# Patient Record
Sex: FEMALE | Race: BLACK OR AFRICAN AMERICAN | Employment: PART TIME | URBAN - METROPOLITAN AREA
[De-identification: names, ages, dates, MRNs, and addresses within clinical notes are randomized per-mention and may not be internally consistent; named-entity substitution may affect disease eponyms.]

---

## 2018-09-25 ENCOUNTER — HOSPITAL ENCOUNTER (EMERGENCY)
Age: 28
Discharge: HOME | End: 2018-09-25
Attending: EMERGENCY MEDICINE
Payer: SELF-PAY

## 2018-09-25 VITALS
HEART RATE: 114 BPM | BODY MASS INDEX: 31.28 KG/M2 | RESPIRATION RATE: 20 BRPM | WEIGHT: 170 LBS | HEIGHT: 62 IN | OXYGEN SATURATION: 100 % | DIASTOLIC BLOOD PRESSURE: 83 MMHG | SYSTOLIC BLOOD PRESSURE: 125 MMHG | TEMPERATURE: 98 F

## 2018-09-25 DIAGNOSIS — J02.9 ACUTE PHARYNGITIS, UNSPECIFIED ETIOLOGY: ICD-10-CM

## 2018-09-25 DIAGNOSIS — N76.0 BV (BACTERIAL VAGINOSIS): Primary | ICD-10-CM

## 2018-09-25 DIAGNOSIS — R73.9 HYPERGLYCEMIA WITHOUT KETOSIS: ICD-10-CM

## 2018-09-25 DIAGNOSIS — N94.9 GENITAL LESION, FEMALE: ICD-10-CM

## 2018-09-25 DIAGNOSIS — B96.89 BV (BACTERIAL VAGINOSIS): Primary | ICD-10-CM

## 2018-09-25 LAB
ANION GAP SERPL CALC-SCNC: 8 MMOL/L (ref 3–18)
APPEARANCE UR: CLEAR
BILIRUB UR QL: NEGATIVE
BUN SERPL-MCNC: 6 MG/DL (ref 7–18)
BUN/CREAT SERPL: 7 (ref 12–20)
CALCIUM SERPL-MCNC: 9.1 MG/DL (ref 8.5–10.1)
CHLORIDE SERPL-SCNC: 98 MMOL/L (ref 100–108)
CO2 SERPL-SCNC: 27 MMOL/L (ref 21–32)
COLOR UR: YELLOW
CREAT SERPL-MCNC: 0.9 MG/DL (ref 0.6–1.3)
GLUCOSE BLD STRIP.AUTO-MCNC: 388 MG/DL (ref 70–110)
GLUCOSE SERPL-MCNC: 341 MG/DL (ref 74–99)
GLUCOSE UR STRIP.AUTO-MCNC: >1000 MG/DL
HCG UR QL: NEGATIVE
HGB UR QL STRIP: NEGATIVE
KETONES UR QL STRIP.AUTO: NEGATIVE MG/DL
LEUKOCYTE ESTERASE UR QL STRIP.AUTO: NEGATIVE
NITRITE UR QL STRIP.AUTO: NEGATIVE
PH UR STRIP: 6 [PH] (ref 5–8)
POTASSIUM SERPL-SCNC: 3.5 MMOL/L (ref 3.5–5.5)
PROT UR STRIP-MCNC: NEGATIVE MG/DL
SERVICE CMNT-IMP: NORMAL
SODIUM SERPL-SCNC: 133 MMOL/L (ref 136–145)
SP GR UR REFRACTOMETRY: >1.03 (ref 1–1.03)
UROBILINOGEN UR QL STRIP.AUTO: 1 EU/DL (ref 0.2–1)
WET PREP GENITAL: NORMAL

## 2018-09-25 PROCEDURE — 81003 URINALYSIS AUTO W/O SCOPE: CPT | Performed by: PHYSICIAN ASSISTANT

## 2018-09-25 PROCEDURE — 87081 CULTURE SCREEN ONLY: CPT | Performed by: EMERGENCY MEDICINE

## 2018-09-25 PROCEDURE — 81025 URINE PREGNANCY TEST: CPT

## 2018-09-25 PROCEDURE — 99284 EMERGENCY DEPT VISIT MOD MDM: CPT

## 2018-09-25 PROCEDURE — 87210 SMEAR WET MOUNT SALINE/INK: CPT | Performed by: PHYSICIAN ASSISTANT

## 2018-09-25 PROCEDURE — 87077 CULTURE AEROBIC IDENTIFY: CPT | Performed by: EMERGENCY MEDICINE

## 2018-09-25 PROCEDURE — 80048 BASIC METABOLIC PNL TOTAL CA: CPT | Performed by: PHYSICIAN ASSISTANT

## 2018-09-25 PROCEDURE — 87255 GENET VIRUS ISOLATE HSV: CPT | Performed by: PHYSICIAN ASSISTANT

## 2018-09-25 PROCEDURE — 82962 GLUCOSE BLOOD TEST: CPT

## 2018-09-25 PROCEDURE — 87491 CHLMYD TRACH DNA AMP PROBE: CPT | Performed by: PHYSICIAN ASSISTANT

## 2018-09-25 RX ORDER — ACYCLOVIR 400 MG/1
400 TABLET ORAL 3 TIMES DAILY
Qty: 30 TAB | Refills: 0 | Status: SHIPPED | OUTPATIENT
Start: 2018-09-25 | End: 2018-10-05

## 2018-09-25 RX ORDER — METRONIDAZOLE 500 MG/1
500 TABLET ORAL 2 TIMES DAILY
Qty: 14 TAB | Refills: 0 | Status: SHIPPED | OUTPATIENT
Start: 2018-09-25 | End: 2018-10-02

## 2018-09-25 NOTE — LETTER
[facility] [place] 
[facility] 
[address] [address] 
[phone] Date: 09/29/2018 Vernelle Cabot Please call [facility] Emergency Department at [phone] to discuss your results as soon as possible. Sincerely, Triston Brooke PA-C

## 2018-09-25 NOTE — ED PROVIDER NOTES
EMERGENCY DEPARTMENT HISTORY AND PHYSICAL EXAM    Date: 9/25/2018  Patient Name: Moises Blackman    History of Presenting Illness     Chief Complaint   Patient presents with    Missed Menses    Vaginal Pain       History Provided By: Patient    Chief Complaint: vaginal sores  Duration: 2-3 Days  Timing:  Acute  Location: vagina  Associated Symptoms: back pain    Additional History (Context):   5:23 PM  Moises Blackman is a 32 y.o. female who presents to the emergency department C/O non tender, vaginal sores onset 2-3 days ago. Associated symptoms include back pain. Pt is sexually active and has a new sexual partner. Pt is concerned for pregnancy. Last menstrual period 1 month ago. Reports recent cold symptoms including sore throat, congestion, hot flashes, and chills. Pmhx of diabetes; pt is noncompliant with medication. Pt denies vaginal discharge, vaginal bleeding, cough, generalized myalgia, abdominal pain, and any other Sx or complaints. PCP: None      Past History     Past Medical History:  History reviewed. No pertinent past medical history. Past Surgical History:  History reviewed. No pertinent surgical history. Family History:  History reviewed. No pertinent family history. Social History:  Social History   Substance Use Topics    Smoking status: Never Smoker    Smokeless tobacco: Never Used    Alcohol use Yes      Comment: socially       Allergies:  No Known Allergies      Review of Systems   Review of Systems   Constitutional: Positive for chills. (+) hot flashes   HENT: Positive for congestion and sore throat. Respiratory: Negative for cough. Cardiovascular: Negative for chest pain. Gastrointestinal: Negative for nausea and vomiting. Genitourinary: Positive for genital sores. Negative for dysuria, flank pain, vaginal bleeding and vaginal discharge. (-) vaginal sores   Musculoskeletal: Positive for back pain. Negative for arthralgias, joint swelling and myalgias. Neurological: Negative for light-headedness. Hematological: Negative for adenopathy. All other systems reviewed and are negative. Physical Exam     Vitals:    09/25/18 1719   BP: 125/83   Pulse: (!) 114   Resp: 20   Temp: 98 °F (36.7 °C)   SpO2: 100%   Weight: 77.1 kg (170 lb)   Height: 5' 2\" (1.575 m)     Physical Exam   Constitutional: She is oriented to person, place, and time. She appears well-developed and well-nourished. No distress. AA female in NAD. Alert. In fast track room   HENT:   Head: Normocephalic and atraumatic. Right Ear: External ear normal. No swelling or tenderness. Tympanic membrane is not perforated, not erythematous and not bulging. Left Ear: External ear normal. No swelling or tenderness. Tympanic membrane is not perforated, not erythematous and not bulging. Nose: Mucosal edema present. No rhinorrhea. Right sinus exhibits no maxillary sinus tenderness and no frontal sinus tenderness. Left sinus exhibits no maxillary sinus tenderness and no frontal sinus tenderness. Mouth/Throat: Uvula is midline and mucous membranes are normal. No oral lesions. No trismus in the jaw. No dental abscesses or uvula swelling. Posterior oropharyngeal erythema present. No oropharyngeal exudate, posterior oropharyngeal edema or tonsillar abscesses. Eyes: Conjunctivae are normal. Right eye exhibits no discharge. Left eye exhibits no discharge. No scleral icterus. Neck: Normal range of motion. Cardiovascular: Normal rate, regular rhythm, normal heart sounds and intact distal pulses. Exam reveals no gallop and no friction rub. No murmur heard. Pulmonary/Chest: Effort normal and breath sounds normal. No accessory muscle usage. No tachypnea. No respiratory distress. She has no decreased breath sounds. She has no wheezes. She has no rhonchi. She has no rales. Abdominal: Soft. She exhibits no distension. There is no tenderness.    Genitourinary:   Genitourinary Comments: Female chaperone in room. See pelvic procedure note. Verbal consent obtained prior to procedure. Musculoskeletal: Normal range of motion. Neurological: She is alert and oriented to person, place, and time. Skin: Skin is warm and dry. No rash noted. She is not diaphoretic. No erythema. Psychiatric: She has a normal mood and affect. Judgment normal.   Nursing note and vitals reviewed. Diagnostic Study Results     Labs -     No results found for this or any previous visit (from the past 12 hour(s)). Radiologic Studies -   No orders to display     CT Results  (Last 48 hours)    None        CXR Results  (Last 48 hours)    None          Medications given in the ED-  Medications - No data to display      Medical Decision Making   I am the first provider for this patient. I reviewed the vital signs, available nursing notes, past medical history, past surgical history, family history and social history. Vital Signs-Reviewed the patient's vital signs. Pulse Oximetry Analysis - 100% on RA     Records Reviewed: Nursing Notes and Old Medical Records    Provider Notes (Medical Decision Making): HSV, BV, UTI, trich, candidiasis, URI, strep, sinusitis, bronchitis, PNA    Procedures:  Pelvic Exam  Date/Time: 9/25/2018 6:00 PM  Performed by: PA  Procedure duration:  15 minutes. As dictated by: Ángel Mcgowan PA-C  Exam assisted by:  Female chaperone in room. Type of exam performed: bimanual and speculum. External genitalia appearance: ulcers. Vaginal exam:  discharge. The amount of discharge was:  scant. The discharge was thin. Cervical exam:  normal and no cervical motion tenderness. Specimen(s) collected:  chlamydia, GC and vaginal culture (HSV culture). Bimanual exam:  normal.    Patient tolerance: Patient tolerated the procedure well with no immediate complications            ED Course:   5:23 PM Initial assessment performed.  The patients presenting problems have been discussed, and they are in agreement with the care plan formulated and outlined with them. I have encouraged them to ask questions as they arise throughout their visit.    7:00 PM Alerted by RN that patient was anxious to leave. Blood sugar has returned at 341 but not DKA. States she will take insulin at home and refusing any further care. Will treat for BV, likely HSV, and await cultures.  consult for PCP FU. Reasons to RTED discussed with pt. All questions answered. Pt feels comfortable going home at this time. Pt expressed understanding and she agrees with plan. Diagnosis and Disposition       DISCHARGE NOTE:  7:04 PM  Jeremie Clemens's  results have been reviewed with her. She has been counseled regarding her diagnosis, treatment, and plan. She verbally conveys understanding and agreement of the signs, symptoms, diagnosis, treatment and prognosis and additionally agrees to follow up as discussed. She also agrees with the care-plan and conveys that all of her questions have been answered. I have also provided discharge instructions for her that include: educational information regarding their diagnosis and treatment, and list of reasons why they would want to return to the ED prior to their follow-up appointment, should her condition change. She has been provided with education for proper emergency department utilization. CLINICAL IMPRESSION:    1. BV (bacterial vaginosis)    2. Genital lesion, female    3. Hyperglycemia without ketosis    4. Acute pharyngitis, unspecified etiology        PLAN:  1. D/C Home  2. Discharge Medication List as of 9/25/2018  7:00 PM      START taking these medications    Details   metroNIDAZOLE (FLAGYL) 500 mg tablet Take 1 Tab by mouth two (2) times a day for 7 days. Indications: Bacterial Vaginosis, Print, Disp-14 Tab, R-0      acyclovir (ZOVIRAX) 400 mg tablet Take 1 Tab by mouth three (3) times daily for 10 days.  Indications: genital herpes simplex, Print, Disp-30 Tab, R-0 3.   Follow-up Information     Follow up With Details Comments [address]   [address] [place] [address]   Follow up for diabetes [address]  [place] [address]    [facility]  As needed, If symptoms worsen [address] [place] [id number]  [phone]        _______________________________    Attestations: This note is prepared by Elvi Ro, acting as Scribe for [employer]. [employer], DUNCAN:  The scribe's documentation has been prepared under my direction and personally reviewed by me in its entirety.   I confirm that the note above accurately reflects all work, treatment, procedures, and medical decision making performed by me.  _______________________________

## 2018-09-25 NOTE — LETTER
Vernelle Cabot [address] [id number] 
 
 
09/27/18 To whom it may concern, Please be advised that Vernelle Cabot We have been trying to reach you with results of your visit but have been unsuccessful. Please call the Emergency Department for your results. Sincerely, Amelia Reich NP

## 2018-09-25 NOTE — ED NOTES
POCT HCG performed with negative results. Patient stable with no complaints at this time. Patient is resting in bed with family at bedside for support. Call light within reach and patient instructed to call if assistance is needed.   Report to be given to oncoming RN 7p-7a

## 2018-09-26 LAB
C TRACH RRNA SPEC QL NAA+PROBE: NEGATIVE
N GONORRHOEA RRNA SPEC QL NAA+PROBE: NEGATIVE
SPECIMEN SOURCE: NORMAL

## 2018-09-27 LAB
B-HEM STREP THROAT QL CULT: ABNORMAL
B-HEM STREP THROAT QL CULT: NEGATIVE
BACTERIA SPEC CULT: ABNORMAL
BACTERIA SPEC CULT: ABNORMAL
SERVICE CMNT-IMP: ABNORMAL

## 2018-09-27 NOTE — CALL BACK NOTE
Attempted to call patient with results of throat culture. There was no answer and I was unable to leave message as mailbox was full. Will send certified letter. Review of records shows patient was not treated Special Requests:   NO SPECIAL REQUESTS NO SPECIAL REQUESTS     
 Strep Screen   NEGATIVE      
 Strep Screen  (NOTE) STREP SCREEN DONE IN ED B. ..     
(NOTE) STREP SCREEN DONE IN ED BY [id number]  
    
 Culture result:   FEW STREPTOCOCCI, BETA HEMOLYT. .. (A)     
FEW STREPTOCOCCI, BETA HEMOLYTIC GROUP C    
 Culture result:   NO BETA HEMOLYTIC STREPTOCOCCUS G. ..     
NO BETA HEMOLYTIC STREPTOCOCCUS GROUP A ISOLATED    
Resulting Agency  [address] [place] [address] [facility] [place] Linnette Pinto 10:53 AM 9/27/18

## 2018-09-29 LAB
HSV SPEC CULT: ABNORMAL
SPECIMEN SOURCE: ABNORMAL

## 2018-09-29 NOTE — CALL BACK NOTE
7:42 PM 
09/29/2018 
 
+ HSV cultures. On antivirals. Called patient to inform of results. No answer. VM was full. Will send certified letter. Given to [facility].   
 
José Miguel Sherman PA-C

## 2018-10-31 ENCOUNTER — HOSPITAL ENCOUNTER (EMERGENCY)
Age: 28
Discharge: HOME | End: 2018-10-31
Attending: EMERGENCY MEDICINE
Payer: SELF-PAY

## 2018-10-31 VITALS
BODY MASS INDEX: 31.28 KG/M2 | OXYGEN SATURATION: 100 % | HEIGHT: 62 IN | SYSTOLIC BLOOD PRESSURE: 136 MMHG | TEMPERATURE: 98 F | RESPIRATION RATE: 18 BRPM | HEART RATE: 121 BPM | DIASTOLIC BLOOD PRESSURE: 84 MMHG | WEIGHT: 170 LBS

## 2018-10-31 DIAGNOSIS — Z86.39 HISTORY OF UNCONTROLLED DIABETES: ICD-10-CM

## 2018-10-31 DIAGNOSIS — L02.415 ABSCESS OF RIGHT THIGH: Primary | ICD-10-CM

## 2018-10-31 PROCEDURE — 77030019895 HC PCKNG STRP IODO -A

## 2018-10-31 PROCEDURE — 99282 EMERGENCY DEPT VISIT SF MDM: CPT

## 2018-10-31 PROCEDURE — 75810000289 HC I&D ABSCESS SIMP/COMP/MULT

## 2018-10-31 RX ORDER — IPRATROPIUM BROMIDE AND ALBUTEROL SULFATE 2.5; .5 MG/3ML; MG/3ML
3 SOLUTION RESPIRATORY (INHALATION)
Status: DISCONTINUED | OUTPATIENT
Start: 2018-10-31 | End: 2018-10-31 | Stop reason: CLARIF

## 2018-10-31 RX ORDER — SULFAMETHOXAZOLE AND TRIMETHOPRIM 800; 160 MG/1; MG/1
2 TABLET ORAL 2 TIMES DAILY
Qty: 40 TAB | Refills: 0 | Status: SHIPPED | OUTPATIENT
Start: 2018-10-31 | End: 2018-11-10

## 2018-10-31 NOTE — DISCHARGE INSTRUCTIONS
Skin Abscess: Care Instructions  Your Care Instructions    A skin abscess is a bacterial infection that forms a pocket of pus. A boil is a kind of skin abscess. The doctor may have cut an opening in the abscess so that the pus can drain out. You may have gauze in the cut so that the abscess will stay open and keep draining. You may need antibiotics. You will need to follow up with your doctor to make sure the infection has gone away. The doctor has checked you carefully, but problems can develop later. If you notice any problems or new symptoms, get medical treatment right away. Follow-up care is a key part of your treatment and safety. Be sure to make and go to all appointments, and call your doctor if you are having problems. It's also a good idea to know your test results and keep a list of the medicines you take. How can you care for yourself at home? · Apply warm and dry compresses, a heating pad set on low, or a hot water bottle 3 or 4 times a day for pain. Keep a cloth between the heat source and your skin. · If your doctor prescribed antibiotics, take them as directed. Do not stop taking them just because you feel better. You need to take the full course of antibiotics. · Take pain medicines exactly as directed. ? If the doctor gave you a prescription medicine for pain, take it as prescribed. ? If you are not taking a prescription pain medicine, ask your doctor if you can take an over-the-counter medicine. · Keep your bandage clean and dry. Change the bandage whenever it gets wet or dirty, or at least one time a day. · If the abscess was packed with gauze:  ? Keep follow-up appointments to have the gauze changed or removed. If the doctor instructed you to remove the gauze, follow the instructions you were given for how to remove it. ? After the gauze is removed, soak the area in warm water for 15 to 20 minutes 2 times a day, until the wound closes. When should you call for help?   Call your doctor now or seek immediate medical care if:    · You have signs of worsening infection, such as:  ? Increased pain, swelling, warmth, or redness. ? Red streaks leading from the infected skin. ? Pus draining from the wound. ? A fever.    Watch closely for changes in your health, and be sure to contact your doctor if:    · You do not get better as expected. Where can you learn more? Go to http://cortez-sotero.info/. Enter Z083 in the search box to learn more about \"Skin Abscess: Care Instructions. \"  Current as of: April 18, 2018  Content Version: 11.8  © 8256-2169 [employer]. Care instructions adapted under license by [employer] (which disclaims liability or warranty for this information). If you have questions about a medical condition or this instruction, always ask your healthcare professional. [address] any warranty or liability for your use of this information.

## 2018-10-31 NOTE — ED PROVIDER NOTES
EMERGENCY DEPARTMENT HISTORY AND PHYSICAL EXAM    Date: 10/31/2018  Patient Name: Jarek Moy    History of Presenting Illness     Chief Complaint   Patient presents with    Skin Problem         History Provided By: Patient    Chief Complaint: painful bump to the right medial proximal thigh  Duration: 1.5 weeks  Timing:  Worsening  Location: right medial proximal thigh  Modifying Factors: warm compresses without relief  Associated Symptoms: denies any other associated signs or symptoms    Additional History (Context):   1:46 PM   Jarek Moy is a 29 y.o. female with PMHX of uncontrolled DM and recurrent abscesses requiring I&D who presents to the emergency department C/O a painful bump the the right medial, proximal thigh which is gradually increasing in size starting 1.5 weeks ago. There has not been any drainage from the area. She has applied warm compresses to the area without relief. Pt is unsure what her blood sugar has been. LMP: 9/28/2018. Pt denies fever, chills, n/v, and any other sxs or complaints. PCP: None        Past History     Past Medical History:  Past Medical History:   Diagnosis Date    Diabetes [facility])        Past Surgical History:  History reviewed. No pertinent surgical history. Family History:  History reviewed. No pertinent family history. Social History:  Social History     Tobacco Use    Smoking status: Current Every Day Smoker    Smokeless tobacco: Never Used   Substance Use Topics    Alcohol use: Yes     Comment: socially    Drug use: No       Allergies:  No Known Allergies      Review of Systems   Review of Systems   Constitutional: Negative for chills and fever. Skin: Negative for rash. Painful bump to right medial proximal thigh   Allergic/Immunologic: Negative for immunocompromised state. Hematological: Negative for adenopathy. All other systems reviewed and are negative.       Physical Exam     Vitals:    10/31/18 1335   BP: 136/84   Pulse: (!) 121 Resp: 18   Temp: 98 °F (36.7 °C)   SpO2: 100%   Weight: 77.1 kg (170 lb)   Height: 5' 2\" (1.575 m)     Physical Exam   Constitutional: She is oriented to person, place, and time. She appears well-developed and well-nourished. No distress. AA female in NAD. Alert. Appears comfortable. HENT:   Head: Normocephalic and atraumatic. Right Ear: External ear normal.   Left Ear: External ear normal.   Nose: Nose normal.   Eyes: Conjunctivae are normal.   Neck: Normal range of motion. Cardiovascular: Normal rate, regular rhythm, normal heart sounds and intact distal pulses. Exam reveals no gallop and no friction rub. No murmur heard. Pulmonary/Chest: Effort normal and breath sounds normal. No accessory muscle usage. No tachypnea. No respiratory distress. She has no decreased breath sounds. She has no wheezes. She has no rhonchi. She has no rales. Musculoskeletal: Normal range of motion. Legs:  Neurological: She is alert and oriented to person, place, and time. Skin: Skin is warm and dry. She is not diaphoretic. Psychiatric: She has a normal mood and affect. Judgment normal.   Nursing note and vitals reviewed. Diagnostic Study Results     Labs -   No results found for this or any previous visit (from the past 12 hour(s)). Radiologic Studies -   No orders to display     Medications given in the ED-  Medications - No data to display      Medical Decision Making   I am the first provider for this patient. I reviewed the vital signs, available nursing notes, past medical history, past surgical history, family history and social history. Vital Signs-Reviewed the patient's vital signs. Pulse Oximetry Analysis - 100% on room air     Records Reviewed: Nursing Notes    Provider Notes (Medical Decision Making): abscess to R thigh. No evidence of deep or systemic process. Will I&D and place on ABX.      Procedures:  I&D Abcess Complex  Date/Time: 10/31/2018 1:52 PM  Performed by: Emir Alberto DUNCAN MONTES  Authorized by: April Richmond PA-C     Consent:     Consent obtained:  Verbal    Consent given by:  Patient    Risks discussed:  Pain    Alternatives discussed:  No treatment  Location:     Type:  Abscess    Size:  4 cm x 3 cm    Location:  Lower extremity    Lower extremity location:  Leg    Leg location:  R upper leg  Pre-procedure details:     Skin preparation:  Betadine  Anesthesia (see MAR for exact dosages): Anesthesia method:  Local infiltration    Local anesthetic:  Lidocaine 1% w/o epi  Procedure type:     Complexity:  Complex  Procedure details:     Needle aspiration: no      Incision types:  Single straight    Incision depth:  Dermal    Scalpel blade:  11    Wound management:  Probed and deloculated    Drainage:  Purulent    Drainage amount:  Copious    Wound treatment:  Wound left open    Packing materials:  1/4 in iodoform gauze    Amount 1/4\" iodoform:  5 cm   Post-procedure details:     Patient tolerance of procedure: Tolerated well, no immediate complications          ED Course:   1:46 PM Initial assessment performed. The patients presenting problems have been discussed, and they are in agreement with the care plan formulated and outlined with them. I have encouraged them to ask questions as they arise throughout their visit. Diagnosis and Disposition     Successful I&D. Packed. Removal in 48 hours. ABX. SITZ baths TID x 10 minutes until healed. Reasons to RTED discussed with pt. All questions answered. Pt feels comfortable going home at this time. Pt expressed understanding and she agrees with plan.  FU for PCP as patient is DMII with no medications. Discussed risks of uncontrolled BS. DISCHARGE NOTE:  2:43 PM   Albert Clemens's  results have been reviewed with her. She has been counseled regarding her diagnosis, treatment, and plan.   She verbally conveys understanding and agreement of the signs, symptoms, diagnosis, treatment and prognosis and additionally agrees to follow up as discussed. She also agrees with the care-plan and conveys that all of her questions have been answered. I have also provided discharge instructions for her that include: educational information regarding their diagnosis and treatment, and list of reasons why they would want to return to the ED prior to their follow-up appointment, should her condition change. She has been provided with education for proper emergency department utilization. CLINICAL IMPRESSION:    1. Abscess of right thigh    2. History of uncontrolled diabetes        PLAN:  1. D/C Home  2. Discharge Medication List as of 10/31/2018  2:42 PM      START taking these medications    Details   trimethoprim-sulfamethoxazole (BACTRIM DS) 160-800 mg per tablet Take 2 Tabs by mouth two (2) times a day for 10 days. , Normal, Disp-40 Tab, R-0           3. Follow-up Information     Follow up With Specialties Details Why [facility] Schedule an appointment as soon as possible for a visit in 2 days For primary care follow up [facility] [address]    THE [facility] EMERGENCY DEPT Emergency Medicine  As needed, If symptoms worsen [address] [address]  [phone]        _______________________________    Attestations: This note is prepared by Evelyn Nicolas, acting as Scribe for Bev Jane PA-C. Bev Jane PA-C:  The scribe's documentation has been prepared under my direction and personally reviewed by me in its entirety.   I confirm that the note above accurately reflects all work, treatment, procedures, and medical decision making performed by me.  _______________________________

## 2021-05-26 ENCOUNTER — APPOINTMENT (OUTPATIENT)
Dept: GENERAL RADIOLOGY | Age: 31
End: 2021-05-26
Attending: EMERGENCY MEDICINE

## 2021-05-26 ENCOUNTER — HOSPITAL ENCOUNTER (EMERGENCY)
Age: 31
Discharge: HOME | End: 2021-05-26
Attending: EMERGENCY MEDICINE

## 2021-05-26 VITALS
BODY MASS INDEX: 31.56 KG/M2 | SYSTOLIC BLOOD PRESSURE: 121 MMHG | OXYGEN SATURATION: 96 % | RESPIRATION RATE: 17 BRPM | DIASTOLIC BLOOD PRESSURE: 93 MMHG | HEIGHT: 62 IN | TEMPERATURE: 99.2 F | WEIGHT: 171.52 LBS | HEART RATE: 114 BPM

## 2021-05-26 DIAGNOSIS — R73.9 HYPERGLYCEMIA: ICD-10-CM

## 2021-05-26 DIAGNOSIS — R10.84 ABDOMINAL PAIN, GENERALIZED: Primary | ICD-10-CM

## 2021-05-26 LAB
ALBUMIN SERPL-MCNC: 3.6 G/DL (ref 3.5–5)
ALBUMIN/GLOB SERPL: 0.7 {RATIO} (ref 1.1–2.2)
ALP SERPL-CCNC: 139 U/L (ref 45–117)
ALT SERPL-CCNC: 18 U/L (ref 12–78)
ANION GAP SERPL CALC-SCNC: 13 MMOL/L (ref 5–15)
APPEARANCE UR: CLEAR
AST SERPL-CCNC: 10 U/L (ref 15–37)
BACTERIA URNS QL MICRO: NEGATIVE /HPF
BASOPHILS # BLD: 0 K/UL (ref 0–0.1)
BASOPHILS NFR BLD: 0 % (ref 0–1)
BILIRUB SERPL-MCNC: 0.3 MG/DL (ref 0.2–1)
BILIRUB UR QL: NEGATIVE
BUN SERPL-MCNC: 8 MG/DL (ref 6–20)
BUN/CREAT SERPL: 9 (ref 12–20)
CALCIUM SERPL-MCNC: 9.1 MG/DL (ref 8.5–10.1)
CHLORIDE SERPL-SCNC: 99 MMOL/L (ref 97–108)
CO2 SERPL-SCNC: 25 MMOL/L (ref 21–32)
COLOR UR: ABNORMAL
COMMENT, HOLDF: NORMAL
CREAT SERPL-MCNC: 0.9 MG/DL (ref 0.55–1.02)
DIFFERENTIAL METHOD BLD: ABNORMAL
EOSINOPHIL # BLD: 0 K/UL (ref 0–0.4)
EOSINOPHIL NFR BLD: 0 % (ref 0–7)
EPITH CASTS URNS QL MICRO: ABNORMAL /LPF
ERYTHROCYTE [DISTWIDTH] IN BLOOD BY AUTOMATED COUNT: 12.4 % (ref 11.5–14.5)
GLOBULIN SER CALC-MCNC: 5.1 G/DL (ref 2–4)
GLUCOSE SERPL-MCNC: 345 MG/DL (ref 65–100)
GLUCOSE UR STRIP.AUTO-MCNC: >1000 MG/DL
HCG SERPL-ACNC: 1 MIU/ML (ref 0–6)
HCG UR QL: NEGATIVE
HCT VFR BLD AUTO: 41.1 % (ref 35–47)
HGB BLD-MCNC: 13.6 G/DL (ref 11.5–16)
HGB UR QL STRIP: NEGATIVE
IMM GRANULOCYTES # BLD AUTO: 0 K/UL (ref 0–0.04)
IMM GRANULOCYTES NFR BLD AUTO: 1 % (ref 0–0.5)
KETONES UR QL STRIP.AUTO: 15 MG/DL
LEUKOCYTE ESTERASE UR QL STRIP.AUTO: NEGATIVE
LIPASE SERPL-CCNC: 124 U/L (ref 73–393)
LYMPHOCYTES # BLD: 2.5 K/UL (ref 0.8–3.5)
LYMPHOCYTES NFR BLD: 36 % (ref 12–49)
MCH RBC QN AUTO: 30.2 PG (ref 26–34)
MCHC RBC AUTO-ENTMCNC: 33.1 G/DL (ref 30–36.5)
MCV RBC AUTO: 91.1 FL (ref 80–99)
MONOCYTES # BLD: 0.5 K/UL (ref 0–1)
MONOCYTES NFR BLD: 7 % (ref 5–13)
MUCOUS THREADS URNS QL MICRO: ABNORMAL /LPF
NEUTS SEG # BLD: 3.9 K/UL (ref 1.8–8)
NEUTS SEG NFR BLD: 56 % (ref 32–75)
NITRITE UR QL STRIP.AUTO: NEGATIVE
NRBC # BLD: 0 K/UL (ref 0–0.01)
NRBC BLD-RTO: 0 PER 100 WBC
PH UR STRIP: 5.5 [PH] (ref 5–8)
PLATELET # BLD AUTO: 270 K/UL (ref 150–400)
PMV BLD AUTO: 9.3 FL (ref 8.9–12.9)
POTASSIUM SERPL-SCNC: 3.3 MMOL/L (ref 3.5–5.1)
PROT SERPL-MCNC: 8.7 G/DL (ref 6.4–8.2)
PROT UR STRIP-MCNC: ABNORMAL MG/DL
RBC # BLD AUTO: 4.51 M/UL (ref 3.8–5.2)
RBC #/AREA URNS HPF: ABNORMAL /HPF (ref 0–5)
SAMPLES BEING HELD,HOLD: NORMAL
SODIUM SERPL-SCNC: 137 MMOL/L (ref 136–145)
SP GR UR REFRACTOMETRY: 1.02 (ref 1–1.03)
UR CULT HOLD, URHOLD: NORMAL
UROBILINOGEN UR QL STRIP.AUTO: 0.2 EU/DL (ref 0.2–1)
WBC # BLD AUTO: 7 K/UL (ref 3.6–11)
WBC URNS QL MICRO: ABNORMAL /HPF (ref 0–4)

## 2021-05-26 PROCEDURE — 81025 URINE PREGNANCY TEST: CPT

## 2021-05-26 PROCEDURE — 83690 ASSAY OF LIPASE: CPT

## 2021-05-26 PROCEDURE — 80053 COMPREHEN METABOLIC PANEL: CPT

## 2021-05-26 PROCEDURE — 81001 URINALYSIS AUTO W/SCOPE: CPT

## 2021-05-26 PROCEDURE — 84702 CHORIONIC GONADOTROPIN TEST: CPT

## 2021-05-26 PROCEDURE — 85025 COMPLETE CBC W/AUTO DIFF WBC: CPT

## 2021-05-26 PROCEDURE — 99285 EMERGENCY DEPT VISIT HI MDM: CPT

## 2021-05-26 PROCEDURE — 74018 RADEX ABDOMEN 1 VIEW: CPT

## 2021-05-26 NOTE — ED PROVIDER NOTES
The history is provided by the patient and a friend. Abdominal Pain   This is a chronic problem. The current episode started more than 1 week ago. The problem occurs every several days. The problem has not changed since onset. The pain is located in the generalized abdominal region. The pain is mild. Pertinent negatives include no anorexia, no fever, no belching, no diarrhea, no flatus, no hematochezia, no melena, no nausea, no vomiting, no constipation, no dysuria, no frequency, no hematuria, no headaches, no arthralgias, no myalgias, no trauma, no chest pain, no testicular pain and no back pain. Nothing worsens the pain. The pain is relieved by nothing. The patient's surgical history non-contributory. Past Medical History:   Diagnosis Date    Diabetes ([facility] [address]        No past surgical history on file. History reviewed. No pertinent family history. Social History     Socioeconomic History    Marital status: SINGLE     Spouse name: Not on file    Number of children: Not on file    Years of education: Not on file    Highest education level: Not on file   Occupational History    Not on file   Tobacco Use    Smoking status: Current Every Day Smoker    Smokeless tobacco: Never Used   Substance and Sexual Activity    Alcohol use: Yes     Comment: socially    Drug use: No    Sexual activity: Not on file   Other Topics Concern    Not on file   Social History Narrative    Not on file     Social Determinants of Health     Financial Resource Strain:     Difficulty of Paying Living Expenses:    Food Insecurity:     Worried About Running Out of Food in the Last Year:     [address] in the Last Year:    Transportation Needs:     Lack of Transportation (Medical):      Lack of Transportation (Non-Medical):    Physical Activity:     Days of Exercise per Week:     Minutes of Exercise per Session:    Stress:     Feeling of Stress :    Social Connections:     Frequency of Communication with Friends and Family:     Frequency of Social Gatherings with Friends and Family:     Attends Caoda[employer] Services:     Active Member of Clubs or Organizations:     Attends Club or Organization Meetings:     Marital Status:    Intimate Partner Violence:     Fear of Current or Ex-Partner:     Emotionally Abused:     Physically Abused:     Sexually Abused: ALLERGIES: Patient has no known allergies. Review of Systems   Constitutional: Negative for activity change, chills and fever. HENT: Negative for nosebleeds, sore throat, trouble swallowing and voice change. Eyes: Negative for visual disturbance. Respiratory: Negative for shortness of breath. Cardiovascular: Negative for chest pain and palpitations. Gastrointestinal: Positive for abdominal pain. Negative for anorexia, constipation, diarrhea, flatus, hematochezia, melena, nausea and vomiting. Genitourinary: Negative for difficulty urinating, dysuria, frequency, hematuria, testicular pain and urgency. Musculoskeletal: Negative for arthralgias, back pain, myalgias, neck pain and neck stiffness. Skin: Negative for color change. Allergic/Immunologic: Negative for immunocompromised state. Neurological: Negative for dizziness, seizures, syncope, weakness, light-headedness, numbness and headaches. Psychiatric/Behavioral: Negative for behavioral problems, confusion, hallucinations, self-injury and suicidal ideas. Vitals:    05/26/21 1143   BP: (!) 132/95   Pulse: (!) 114   Resp: 17   Temp: 99.2 °F (37.3 °C)   SpO2: 100%   Weight: 77.8 kg (171 lb 8.3 oz)   Height: 5' 2\" (1.575 m)            Physical Exam  Vitals and nursing note reviewed. Constitutional:       General: She is not in acute distress. Appearance: She is well-developed. She is not diaphoretic. HENT:      Head: Normocephalic and atraumatic. Eyes:      Pupils: Pupils are equal, round, and reactive to light.    Cardiovascular:      Rate and Rhythm: Normal rate and regular rhythm. Heart sounds: Normal heart sounds. No murmur heard. No friction rub. No gallop. Pulmonary:      Effort: Pulmonary effort is normal. No respiratory distress. Breath sounds: Normal breath sounds. No wheezing. Abdominal:      General: Bowel sounds are normal. There is no distension. Palpations: Abdomen is soft. Tenderness: There is no abdominal tenderness. There is no guarding or rebound. Musculoskeletal:         General: Normal range of motion. Cervical back: Normal range of motion and neck supple. Skin:     General: Skin is warm. Findings: No rash. Neurological:      Mental Status: She is alert and oriented to person, place, and time. Psychiatric:         Behavior: Behavior normal.         Thought Content: Thought content normal.         Judgment: Judgment normal.          MDM     This is a 27-year-old female with past medical history, review of systems, physical exam as above, presenting with complaints of decreased menses, and abdominal distention, occasional cramping. Patient states last menses was 4 months ago. She endorses occasional cramping every several days. She denies nausea, vomiting, chest pain or shortness of breath, diarrhea, or constipation, dysuria, vaginal bleeding, vaginal pain, or discharge. She states she has not seen her GYN, nor her primary care physician. Physical exam is remarkable for a well-appearing young female, in no acute distress. She is noted to be mildly hypertensive, afebrile, tachycardic in triage, satting well on room air. She has soft nontender abdomen, clear breath sounds, regular rate and rhythm at the time of exam.  She currently denies pain. Point-of-care pregnancy test at bedside is negative. Plan to obtain CMP, CBC, UA, blood hCG, obtain KUB. We will reassess, and make a disposition.     Procedures    Update:  Patient remains in no acute distress, tachycardia improved with rest, hyperglycemia noted with glucosuria, without other metabolic abnormality. KUB within normal limits, negative pregnancy and serum hCG. Discussed differential with patient and friend at bedside. Recommend PCP and GYN follow-up, return precautions given.

## 2021-05-26 NOTE — ED TRIAGE NOTES
TRIAGE NOTE: Patient arrived from home with c/o abdominal pain for the past month. +N/V/D. Denies any urinary problems.

## 2022-06-21 ENCOUNTER — HOSPITAL ENCOUNTER (EMERGENCY)
Age: 32
Discharge: HOME | End: 2022-06-21
Attending: EMERGENCY MEDICINE
Payer: COMMERCIAL

## 2022-06-21 VITALS
TEMPERATURE: 98.5 F | OXYGEN SATURATION: 100 % | RESPIRATION RATE: 23 BRPM | HEART RATE: 82 BPM | BODY MASS INDEX: 32.18 KG/M2 | WEIGHT: 175.93 LBS | SYSTOLIC BLOOD PRESSURE: 134 MMHG | DIASTOLIC BLOOD PRESSURE: 90 MMHG

## 2022-06-21 DIAGNOSIS — R11.2 NAUSEA AND VOMITING, UNSPECIFIED VOMITING TYPE: Primary | ICD-10-CM

## 2022-06-21 LAB
ALBUMIN SERPL-MCNC: 3.5 G/DL (ref 3.5–5)
ALBUMIN/GLOB SERPL: 0.8 {RATIO} (ref 1.1–2.2)
ALP SERPL-CCNC: 97 U/L (ref 45–117)
ALT SERPL-CCNC: 14 U/L (ref 12–78)
ANION GAP SERPL CALC-SCNC: 9 MMOL/L (ref 5–15)
APPEARANCE UR: ABNORMAL
AST SERPL-CCNC: 8 U/L (ref 15–37)
BACTERIA URNS QL MICRO: ABNORMAL /HPF
BASOPHILS # BLD: 0 K/UL (ref 0–0.1)
BASOPHILS NFR BLD: 0 % (ref 0–1)
BILIRUB SERPL-MCNC: 0.2 MG/DL (ref 0.2–1)
BILIRUB UR QL: NEGATIVE
BUN SERPL-MCNC: 11 MG/DL (ref 6–20)
BUN/CREAT SERPL: 13 (ref 12–20)
CALCIUM SERPL-MCNC: 8.8 MG/DL (ref 8.5–10.1)
CHLORIDE SERPL-SCNC: 102 MMOL/L (ref 97–108)
CO2 SERPL-SCNC: 27 MMOL/L (ref 21–32)
COLOR UR: ABNORMAL
COMMENT, HOLDF: NORMAL
CREAT SERPL-MCNC: 0.88 MG/DL (ref 0.55–1.02)
DIFFERENTIAL METHOD BLD: NORMAL
EOSINOPHIL # BLD: 0 K/UL (ref 0–0.4)
EOSINOPHIL NFR BLD: 0 % (ref 0–7)
EPITH CASTS URNS QL MICRO: ABNORMAL /LPF
ERYTHROCYTE [DISTWIDTH] IN BLOOD BY AUTOMATED COUNT: 12.4 % (ref 11.5–14.5)
GLOBULIN SER CALC-MCNC: 4.6 G/DL (ref 2–4)
GLUCOSE SERPL-MCNC: 275 MG/DL (ref 65–100)
GLUCOSE UR STRIP.AUTO-MCNC: >1000 MG/DL
HCG SERPL QL: NEGATIVE
HCT VFR BLD AUTO: 39.3 % (ref 35–47)
HGB BLD-MCNC: 12.9 G/DL (ref 11.5–16)
HGB UR QL STRIP: ABNORMAL
IMM GRANULOCYTES # BLD AUTO: 0 K/UL (ref 0–0.04)
IMM GRANULOCYTES NFR BLD AUTO: 0 % (ref 0–0.5)
KETONES UR QL STRIP.AUTO: ABNORMAL MG/DL
LEUKOCYTE ESTERASE UR QL STRIP.AUTO: NEGATIVE
LYMPHOCYTES # BLD: 2.2 K/UL (ref 0.8–3.5)
LYMPHOCYTES NFR BLD: 37 % (ref 12–49)
MCH RBC QN AUTO: 30.6 PG (ref 26–34)
MCHC RBC AUTO-ENTMCNC: 32.8 G/DL (ref 30–36.5)
MCV RBC AUTO: 93.1 FL (ref 80–99)
MONOCYTES # BLD: 0.3 K/UL (ref 0–1)
MONOCYTES NFR BLD: 6 % (ref 5–13)
NEUTS SEG # BLD: 3.4 K/UL (ref 1.8–8)
NEUTS SEG NFR BLD: 57 % (ref 32–75)
NITRITE UR QL STRIP.AUTO: NEGATIVE
NRBC # BLD: 0 K/UL (ref 0–0.01)
NRBC BLD-RTO: 0 PER 100 WBC
PH UR STRIP: 6 [PH] (ref 5–8)
PLATELET # BLD AUTO: 290 K/UL (ref 150–400)
PMV BLD AUTO: 8.9 FL (ref 8.9–12.9)
POTASSIUM SERPL-SCNC: 3.3 MMOL/L (ref 3.5–5.1)
PROT SERPL-MCNC: 8.1 G/DL (ref 6.4–8.2)
PROT UR STRIP-MCNC: NEGATIVE MG/DL
RBC # BLD AUTO: 4.22 M/UL (ref 3.8–5.2)
RBC #/AREA URNS HPF: ABNORMAL /HPF (ref 0–5)
SAMPLES BEING HELD,HOLD: NORMAL
SODIUM SERPL-SCNC: 138 MMOL/L (ref 136–145)
SP GR UR REFRACTOMETRY: 1.02 (ref 1–1.03)
UR CULT HOLD, URHOLD: NORMAL
UROBILINOGEN UR QL STRIP.AUTO: 1 EU/DL (ref 0.2–1)
WBC # BLD AUTO: 6 K/UL (ref 3.6–11)
WBC URNS QL MICRO: ABNORMAL /HPF (ref 0–4)

## 2022-06-21 PROCEDURE — 81001 URINALYSIS AUTO W/SCOPE: CPT

## 2022-06-21 PROCEDURE — 84703 CHORIONIC GONADOTROPIN ASSAY: CPT

## 2022-06-21 PROCEDURE — 74011250636 HC RX REV CODE- 250/636: Performed by: EMERGENCY MEDICINE

## 2022-06-21 PROCEDURE — 96374 THER/PROPH/DIAG INJ IV PUSH: CPT

## 2022-06-21 PROCEDURE — 85025 COMPLETE CBC W/AUTO DIFF WBC: CPT

## 2022-06-21 PROCEDURE — 99284 EMERGENCY DEPT VISIT MOD MDM: CPT

## 2022-06-21 PROCEDURE — 80053 COMPREHEN METABOLIC PANEL: CPT

## 2022-06-21 PROCEDURE — 36415 COLL VENOUS BLD VENIPUNCTURE: CPT

## 2022-06-21 RX ORDER — ONDANSETRON 2 MG/ML
4 INJECTION INTRAMUSCULAR; INTRAVENOUS
Status: COMPLETED | OUTPATIENT
Start: 2022-06-21 | End: 2022-06-21

## 2022-06-21 RX ORDER — ONDANSETRON 8 MG/1
8 TABLET, ORALLY DISINTEGRATING ORAL
Qty: 15 TABLET | Refills: 0 | Status: SHIPPED | OUTPATIENT
Start: 2022-06-21 | End: 2022-06-22 | Stop reason: SDUPTHER

## 2022-06-21 RX ADMIN — ONDANSETRON HYDROCHLORIDE 4 MG: 2 SOLUTION INTRAMUSCULAR; INTRAVENOUS at 19:56

## 2022-06-21 RX ADMIN — SODIUM CHLORIDE 1000 ML: 9 INJECTION, SOLUTION INTRAVENOUS at 19:56

## 2022-06-21 NOTE — ED TRIAGE NOTES
Pt c/o n/v and b/l leg weakness x5 days. She also reports she missed her menstrual cycle was missed 6/9. PT states she has been unable to take her metformin and notes her sugars are elevated. Pt presents to Ed awake, alert, oriented, ambulatory, uncomfortable.

## 2022-06-22 RX ORDER — ONDANSETRON 8 MG/1
8 TABLET, ORALLY DISINTEGRATING ORAL
Qty: 15 TABLET | Refills: 0 | Status: SHIPPED | OUTPATIENT
Start: 2022-06-22 | End: 2022-08-17

## 2022-06-22 NOTE — ED PROVIDER NOTES
The history is provided by the patient. Vomiting   This is a new problem. The current episode started more than 2 days ago. The problem occurs 2 to 4 times per day. The problem has not changed since onset. The emesis has an appearance of stomach contents. There has been no fever. Pertinent negatives include no chills, no fever, no abdominal pain and no diarrhea. The patient maybe pregnant. Her past medical history is significant for DM. Past Medical History:   Diagnosis Date    Diabetes ([address]        No past surgical history on file. No family history on file. Social History     Socioeconomic History    Marital status: SINGLE     Spouse name: Not on file    Number of children: Not on file    Years of education: Not on file    Highest education level: Not on file   Occupational History    Not on file   Tobacco Use    Smoking status: Current Every Day Smoker    Smokeless tobacco: Never Used   Substance and Sexual Activity    Alcohol use: Yes     Comment: socially    Drug use: No    Sexual activity: Not on file   Other Topics Concern    Not on file   Social History Narrative    Not on file     Social Determinants of Health     Financial Resource Strain:     Difficulty of Paying Living Expenses: Not on file   Food Insecurity:     Worried About Running Out of Food in the Last Year: Not on file    Jos of Food in the Last Year: Not on file   Transportation Needs:     Lack of Transportation (Medical): Not on file    Lack of Transportation (Non-Medical):  Not on file   Physical Activity:     Days of Exercise per Week: Not on file    Minutes of Exercise per Session: Not on file   Stress:     Feeling of Stress : Not on file   Social Connections:     Frequency of Communication with Friends and Family: Not on file    Frequency of Social Gatherings with Friends and Family: Not on file    Attends Restoration Services: Not on file    Active Member of Clubs or Organizations: Not on file   [facility] Attends Club or Organization Meetings: Not on file    Marital Status: Not on file   Intimate Partner Violence:     Fear of Current or Ex-Partner: Not on file    Emotionally Abused: Not on file    Physically Abused: Not on file    Sexually Abused: Not on file   Housing Stability:     Unable to Pay for Housing in the Last Year: Not on file    Number of [place] in the Last Year: Not on file    Unstable Housing in the Last Year: Not on file         ALLERGIES: Patient has no known allergies. Review of Systems   Constitutional: Negative for chills and fever. Gastrointestinal: Positive for nausea and vomiting. Negative for abdominal pain and diarrhea. Genitourinary: Negative for dysuria, flank pain, frequency and urgency. All other systems reviewed and are negative. Vitals:    06/21/22 1944   BP: (!) 135/96   Pulse: (!) 101   Resp: 16   Temp: 98.5 °F (36.9 °C)   SpO2: 99%   Weight: 79.8 kg (175 lb 14.8 oz)            Physical Exam  Vitals and nursing note reviewed. Constitutional:       Appearance: She is well-developed. HENT:      Head: Normocephalic and atraumatic. Eyes:      Pupils: Pupils are equal, round, and reactive to light. Cardiovascular:      Rate and Rhythm: Regular rhythm. Tachycardia present. Pulmonary:      Effort: Pulmonary effort is normal.   Abdominal:      General: There is no distension. Palpations: Abdomen is soft. Tenderness: There is no abdominal tenderness. Musculoskeletal:      Cervical back: Normal range of motion and neck supple. Skin:     General: Skin is warm and dry. Capillary Refill: Capillary refill takes less than 2 seconds. Neurological:      General: No focal deficit present. Mental Status: She is alert and oriented to person, place, and time.    Psychiatric:         Mood and Affect: Mood normal.         Behavior: Behavior normal.          MDM       Procedures        The patient is resting comfortably and feels better, is alert and in no distress. The repeat examination is unremarkable and benign; in particular, there is no discomfort at McBurney's point. The history, exam, diagnostic testing, and current condition do not suggest acute appendicitis, bowel obstruction, incarcerated hernia, acute cholecystitis, bowel perforation, major gastrointestinal bleeding, severe diverticulitis, sepsis, DKA, or other significant pathology to warrant further testing, continued ED treatment, admission, or surgical evaluation at this point. The vital signs have been stable and are within normal limits at this time. The patient does not have uncontrollable pain, intractable vomiting, or other significant symptoms. The patient's condition is stable and appropriate for discharge. The patient will pursue further outpatient evaluation with the primary care physician or other designated or consulting physician as indicated in the discharge instructions.

## 2022-06-22 NOTE — ED NOTES
PT reports feeling mildly better, c/o mild headache. PT provided with diet ginger ale and yandel nino for PO challenge.

## 2022-08-17 ENCOUNTER — APPOINTMENT (OUTPATIENT)
Dept: GENERAL RADIOLOGY | Age: 32
End: 2022-08-17
Attending: EMERGENCY MEDICINE
Payer: COMMERCIAL

## 2022-08-17 ENCOUNTER — HOSPITAL ENCOUNTER (EMERGENCY)
Age: 32
Discharge: HOME | End: 2022-08-17
Attending: EMERGENCY MEDICINE
Payer: COMMERCIAL

## 2022-08-17 VITALS
HEIGHT: 62 IN | WEIGHT: 162.04 LBS | DIASTOLIC BLOOD PRESSURE: 83 MMHG | BODY MASS INDEX: 29.82 KG/M2 | SYSTOLIC BLOOD PRESSURE: 109 MMHG | RESPIRATION RATE: 18 BRPM | TEMPERATURE: 98.1 F | HEART RATE: 97 BPM | OXYGEN SATURATION: 98 %

## 2022-08-17 DIAGNOSIS — J45.901 ASTHMA WITH ACUTE EXACERBATION, UNSPECIFIED ASTHMA SEVERITY, UNSPECIFIED WHETHER PERSISTENT: Primary | ICD-10-CM

## 2022-08-17 LAB
ATRIAL RATE: 106 BPM
CALCULATED P AXIS, ECG09: 61 DEGREES
CALCULATED R AXIS, ECG10: 12 DEGREES
CALCULATED T AXIS, ECG11: 9 DEGREES
DIAGNOSIS, 93000: NORMAL
P-R INTERVAL, ECG05: 132 MS
Q-T INTERVAL, ECG07: 360 MS
QRS DURATION, ECG06: 70 MS
QTC CALCULATION (BEZET), ECG08: 478 MS
VENTRICULAR RATE, ECG03: 106 BPM

## 2022-08-17 PROCEDURE — 77030029684 HC NEB SM VOL KT MONA -A

## 2022-08-17 PROCEDURE — 74011636637 HC RX REV CODE- 636/637: Performed by: EMERGENCY MEDICINE

## 2022-08-17 PROCEDURE — 74011000250 HC RX REV CODE- 250: Performed by: EMERGENCY MEDICINE

## 2022-08-17 PROCEDURE — 93005 ELECTROCARDIOGRAM TRACING: CPT

## 2022-08-17 PROCEDURE — 71046 X-RAY EXAM CHEST 2 VIEWS: CPT

## 2022-08-17 PROCEDURE — 99284 EMERGENCY DEPT VISIT MOD MDM: CPT

## 2022-08-17 RX ORDER — ALBUTEROL SULFATE 90 UG/1
2 AEROSOL, METERED RESPIRATORY (INHALATION)
Qty: 18 G | Refills: 0 | Status: SHIPPED | OUTPATIENT
Start: 2022-08-17

## 2022-08-17 RX ORDER — IPRATROPIUM BROMIDE AND ALBUTEROL SULFATE 2.5; .5 MG/3ML; MG/3ML
3 SOLUTION RESPIRATORY (INHALATION)
Status: COMPLETED | OUTPATIENT
Start: 2022-08-17 | End: 2022-08-17

## 2022-08-17 RX ORDER — PREDNISONE 50 MG/1
50 TABLET ORAL DAILY
Qty: 3 TABLET | Refills: 0 | Status: SHIPPED | OUTPATIENT
Start: 2022-08-18 | End: 2022-08-21

## 2022-08-17 RX ADMIN — IPRATROPIUM BROMIDE AND ALBUTEROL SULFATE 3 ML: .5; 3 SOLUTION RESPIRATORY (INHALATION) at 16:19

## 2022-08-17 RX ADMIN — PREDNISONE 50 MG: 5 TABLET ORAL at 16:19

## 2022-08-17 NOTE — ED TRIAGE NOTES
Patient arrives ambulatory to ed with complaints of shortness of breath for the past two weeks. Patient states a history of asthma, does not take any medications. Denies cough or fevers.

## 2022-08-17 NOTE — DISCHARGE INSTRUCTIONS
Thank you for allowing us to provide you with medical care today. We realize that you have many choices for your emergency care needs. We thank you for choosing [facility]. Please choose us in the future for any continued health care needs. We hope we addressed all of your medical concerns. We strive to provide excellent quality care in the Emergency Department. Anything less than excellent does not meet our expectations. The exam and treatment you received in the Emergency Department were for an emergent problem and are not intended as complete care. It is important that you follow up with a doctor, nurse practitioner, or physician's assistant for ongoing care. If your symptoms worsen or you do not improve as expected and you are unable to reach your usual health care provider, you should return to the Emergency Department. We are available 24 hours a day. Take this sheet with you when you go to your follow-up visit. If you have any problem arranging the follow-up visit, contact the Emergency Department immediately. Make an appointment your family doctor for follow up of this visit. Return to the ER if you are unable to be seen in a timely manner.

## 2022-08-17 NOTE — ED PROVIDER NOTES
35-year-old female history of asthma and diabetes presents to the emergency department chief complaint shortness of breath for the past 2 weeks. This is been intermittently worsening for the past 2 weeks. She currently does not use any controller medications for asthma. She has not noticed any exacerbating or alleviating factors. No cough. No fever. No nausea or vomiting. No headaches. No sick contacts. The history is provided by the patient and medical records. Asthma  This is a new problem. The current episode started more than 1 week ago. The problem occurs constantly. Associated symptoms include shortness of breath. Pertinent negatives include no chest pain, no abdominal pain and no headaches. She has tried nothing for the symptoms. Past Medical History:   Diagnosis Date    Asthma     Diabetes ([facility] [address]        History reviewed. No pertinent surgical history. History reviewed. No pertinent family history. Social History     Socioeconomic History    Marital status: SINGLE     Spouse name: Not on file    Number of children: Not on file    Years of education: Not on file    Highest education level: Not on file   Occupational History    Not on file   Tobacco Use    Smoking status: Every Day     Types: Cigarettes    Smokeless tobacco: Current   Substance and Sexual Activity    Alcohol use: Yes     Comment: socially    Drug use: No    Sexual activity: Not on file   Other Topics Concern    Not on file   Social History Narrative    Not on file     Social Determinants of Health     Financial Resource Strain: Not on file   Food Insecurity: Not on file   Transportation Needs: Not on file   Physical Activity: Not on file   Stress: Not on file   Social Connections: Not on file   Intimate Partner Violence: Not on file   Housing Stability: Not on file         ALLERGIES: Patient has no known allergies. Review of Systems   Constitutional:  Negative for fatigue and fever.    HENT:  Negative for sneezing and sore throat. Respiratory:  Positive for shortness of breath. Negative for cough. Cardiovascular:  Negative for chest pain and leg swelling. Gastrointestinal:  Negative for abdominal pain, diarrhea, nausea and vomiting. Genitourinary:  Negative for difficulty urinating and dysuria. Musculoskeletal:  Negative for arthralgias and myalgias. Skin:  Negative for color change and rash. Neurological:  Negative for weakness and headaches. Psychiatric/Behavioral:  Negative for agitation and behavioral problems. Vitals:    08/17/22 1539 08/17/22 1540 08/17/22 1544   BP: 113/81     Pulse: (!) 110     Resp: 18     Temp: 98.1 °F (36.7 °C)     SpO2:  98% 98%   Weight: 73.5 kg (162 lb 0.6 oz)     Height: 5' 2\" (1.575 m)              Physical Exam  Vitals and nursing note reviewed. Constitutional:       General: She is not in acute distress. Appearance: Normal appearance. She is well-developed. She is not ill-appearing, toxic-appearing or diaphoretic. HENT:      Head: Normocephalic and atraumatic. Nose: Nose normal.      Mouth/Throat:      Mouth: Mucous membranes are moist.      Pharynx: Oropharynx is clear. Eyes:      Extraocular Movements: Extraocular movements intact. Conjunctiva/sclera: Conjunctivae normal.      Pupils: Pupils are equal, round, and reactive to light. Cardiovascular:      Rate and Rhythm: Normal rate and regular rhythm. Pulses: Normal pulses. Heart sounds: Normal heart sounds. Pulmonary:      Effort: Pulmonary effort is normal. No respiratory distress. Breath sounds: Decreased breath sounds present. No wheezing. Chest:      Chest wall: No tenderness. Abdominal:      General: Abdomen is flat. There is no distension. Palpations: Abdomen is soft. Tenderness: There is no abdominal tenderness. There is no guarding or rebound. Musculoskeletal:         General: No swelling, tenderness, deformity or signs of injury. Normal range of motion. Cervical back: Normal range of motion and neck supple. No rigidity. No muscular tenderness. Right lower leg: No edema. Left lower leg: No edema. Skin:     General: Skin is warm and dry. Capillary Refill: Capillary refill takes less than 2 seconds. Neurological:      General: No focal deficit present. Mental Status: She is alert and oriented to person, place, and time. Psychiatric:         Mood and Affect: Mood normal.         Behavior: Behavior normal.        MDM  Number of Diagnoses or Management Options  Diagnosis management comments: 72-year-old female presents as above with asthma exacerbation. Plan to treat with DuoNeb, steroid burst, follow-up with primary care, return if needed. Amount and/or Complexity of Data Reviewed  Tests in the radiology section of CPT®: reviewed  Tests in the medicine section of CPT®: reviewed      ED Course as of 08/17/22 1628   Wed Aug 17, 2022   1556   ED EKG interpretation:  Rhythm: Sinus tachycardia at a rate of approximately 106. Axis: normal.  ST segment:  No concerning ST elevations or depressions. This EKG was interpreted by Maryann Hastings MD,ED Provider. [JM]   7548 Patient reassessed at this time, improving with DuoNeb.  [JM]      ED Course User Index  [JM] Vipul Fonseca MD       Procedures

## 2022-10-04 ENCOUNTER — HOSPITAL ENCOUNTER (EMERGENCY)
Age: 32
Discharge: HOME | End: 2022-10-04
Attending: EMERGENCY MEDICINE | Admitting: EMERGENCY MEDICINE
Payer: COMMERCIAL

## 2022-10-04 VITALS
DIASTOLIC BLOOD PRESSURE: 92 MMHG | SYSTOLIC BLOOD PRESSURE: 119 MMHG | BODY MASS INDEX: 29.84 KG/M2 | TEMPERATURE: 98.4 F | HEART RATE: 89 BPM | RESPIRATION RATE: 18 BRPM | OXYGEN SATURATION: 98 % | WEIGHT: 163.14 LBS

## 2022-10-04 DIAGNOSIS — R11.10 VOMITING, UNSPECIFIED VOMITING TYPE, UNSPECIFIED WHETHER NAUSEA PRESENT: Primary | ICD-10-CM

## 2022-10-04 DIAGNOSIS — R19.7 DIARRHEA, UNSPECIFIED TYPE: ICD-10-CM

## 2022-10-04 LAB
ALBUMIN SERPL-MCNC: 4.4 G/DL (ref 3.5–5)
ALBUMIN/GLOB SERPL: 0.9 {RATIO} (ref 1.1–2.2)
ALP SERPL-CCNC: 144 U/L (ref 45–117)
ALT SERPL-CCNC: 16 U/L (ref 12–78)
ANION GAP SERPL CALC-SCNC: 13 MMOL/L (ref 5–15)
APPEARANCE UR: CLEAR
AST SERPL-CCNC: 10 U/L (ref 15–37)
BACTERIA URNS QL MICRO: NEGATIVE /HPF
BASOPHILS # BLD: 0 K/UL (ref 0–0.1)
BASOPHILS NFR BLD: 1 % (ref 0–1)
BILIRUB SERPL-MCNC: 0.3 MG/DL (ref 0.2–1)
BILIRUB UR QL: NEGATIVE
BUN SERPL-MCNC: 12 MG/DL (ref 6–20)
BUN/CREAT SERPL: 14 (ref 12–20)
CALCIUM SERPL-MCNC: 9.4 MG/DL (ref 8.5–10.1)
CHLORIDE SERPL-SCNC: 96 MMOL/L (ref 97–108)
CO2 SERPL-SCNC: 24 MMOL/L (ref 21–32)
COLOR UR: ABNORMAL
CREAT SERPL-MCNC: 0.87 MG/DL (ref 0.55–1.02)
DIFFERENTIAL METHOD BLD: NORMAL
EOSINOPHIL # BLD: 0.2 K/UL (ref 0–0.4)
EOSINOPHIL NFR BLD: 3 % (ref 0–7)
EPITH CASTS URNS QL MICRO: ABNORMAL /LPF
ERYTHROCYTE [DISTWIDTH] IN BLOOD BY AUTOMATED COUNT: 13 % (ref 11.5–14.5)
GLOBULIN SER CALC-MCNC: 4.8 G/DL (ref 2–4)
GLUCOSE SERPL-MCNC: 343 MG/DL (ref 65–100)
GLUCOSE UR STRIP.AUTO-MCNC: >1000 MG/DL
HCG UR QL: NEGATIVE
HCT VFR BLD AUTO: 43.2 % (ref 35–47)
HGB BLD-MCNC: 14.4 G/DL (ref 11.5–16)
HGB UR QL STRIP: NEGATIVE
IMM GRANULOCYTES # BLD AUTO: 0 K/UL (ref 0–0.04)
IMM GRANULOCYTES NFR BLD AUTO: 0 % (ref 0–0.5)
KETONES UR QL STRIP.AUTO: NEGATIVE MG/DL
LEUKOCYTE ESTERASE UR QL STRIP.AUTO: NEGATIVE
LIPASE SERPL-CCNC: 223 U/L (ref 73–393)
LYMPHOCYTES # BLD: 2 K/UL (ref 0.8–3.5)
LYMPHOCYTES NFR BLD: 33 % (ref 12–49)
MCH RBC QN AUTO: 31.5 PG (ref 26–34)
MCHC RBC AUTO-ENTMCNC: 33.3 G/DL (ref 30–36.5)
MCV RBC AUTO: 94.5 FL (ref 80–99)
MONOCYTES # BLD: 0.4 K/UL (ref 0–1)
MONOCYTES NFR BLD: 6 % (ref 5–13)
NEUTS SEG # BLD: 3.6 K/UL (ref 1.8–8)
NEUTS SEG NFR BLD: 57 % (ref 32–75)
NITRITE UR QL STRIP.AUTO: NEGATIVE
NRBC # BLD: 0 K/UL (ref 0–0.01)
NRBC BLD-RTO: 0 PER 100 WBC
PH UR STRIP: 6 [PH] (ref 5–8)
PLATELET # BLD AUTO: 330 K/UL (ref 150–400)
PMV BLD AUTO: 9.1 FL (ref 8.9–12.9)
POTASSIUM SERPL-SCNC: 3.6 MMOL/L (ref 3.5–5.1)
PROT SERPL-MCNC: 9.2 G/DL (ref 6.4–8.2)
PROT UR STRIP-MCNC: NEGATIVE MG/DL
RBC # BLD AUTO: 4.57 M/UL (ref 3.8–5.2)
RBC #/AREA URNS HPF: ABNORMAL /HPF (ref 0–5)
SODIUM SERPL-SCNC: 133 MMOL/L (ref 136–145)
SP GR UR REFRACTOMETRY: 1.02 (ref 1–1.03)
UROBILINOGEN UR QL STRIP.AUTO: 0.2 EU/DL (ref 0.2–1)
WBC # BLD AUTO: 6.3 K/UL (ref 3.6–11)
WBC URNS QL MICRO: ABNORMAL /HPF (ref 0–4)

## 2022-10-04 PROCEDURE — 85025 COMPLETE CBC W/AUTO DIFF WBC: CPT

## 2022-10-04 PROCEDURE — 81001 URINALYSIS AUTO W/SCOPE: CPT

## 2022-10-04 PROCEDURE — 96361 HYDRATE IV INFUSION ADD-ON: CPT | Performed by: EMERGENCY MEDICINE

## 2022-10-04 PROCEDURE — 96374 THER/PROPH/DIAG INJ IV PUSH: CPT | Performed by: EMERGENCY MEDICINE

## 2022-10-04 PROCEDURE — 96375 TX/PRO/DX INJ NEW DRUG ADDON: CPT | Performed by: EMERGENCY MEDICINE

## 2022-10-04 PROCEDURE — 99284 EMERGENCY DEPT VISIT MOD MDM: CPT | Performed by: EMERGENCY MEDICINE

## 2022-10-04 PROCEDURE — 36415 COLL VENOUS BLD VENIPUNCTURE: CPT

## 2022-10-04 PROCEDURE — 74011250636 HC RX REV CODE- 250/636: Performed by: EMERGENCY MEDICINE

## 2022-10-04 PROCEDURE — 80053 COMPREHEN METABOLIC PANEL: CPT

## 2022-10-04 PROCEDURE — 83690 ASSAY OF LIPASE: CPT

## 2022-10-04 PROCEDURE — 81025 URINE PREGNANCY TEST: CPT

## 2022-10-04 RX ORDER — ONDANSETRON 2 MG/ML
4 INJECTION INTRAMUSCULAR; INTRAVENOUS
Status: COMPLETED | OUTPATIENT
Start: 2022-10-04 | End: 2022-10-04

## 2022-10-04 RX ORDER — GLIPIZIDE 5 MG/1
5 TABLET, FILM COATED, EXTENDED RELEASE ORAL DAILY
COMMUNITY
Start: 2022-08-21

## 2022-10-04 RX ORDER — ONDANSETRON 4 MG/1
4 TABLET, ORALLY DISINTEGRATING ORAL
Qty: 5 TABLET | Refills: 0 | Status: SHIPPED | OUTPATIENT
Start: 2022-10-04

## 2022-10-04 RX ORDER — KETOROLAC TROMETHAMINE 30 MG/ML
15 INJECTION, SOLUTION INTRAMUSCULAR; INTRAVENOUS
Status: COMPLETED | OUTPATIENT
Start: 2022-10-04 | End: 2022-10-04

## 2022-10-04 RX ORDER — METFORMIN HYDROCHLORIDE 500 MG/1
500 TABLET ORAL 2 TIMES DAILY WITH MEALS
COMMUNITY

## 2022-10-04 RX ADMIN — SODIUM CHLORIDE 1000 ML: 9 INJECTION, SOLUTION INTRAVENOUS at 10:18

## 2022-10-04 RX ADMIN — ONDANSETRON HYDROCHLORIDE 4 MG: 2 INJECTION, SOLUTION INTRAMUSCULAR; INTRAVENOUS at 10:22

## 2022-10-04 RX ADMIN — KETOROLAC TROMETHAMINE 15 MG: 30 INJECTION, SOLUTION INTRAMUSCULAR at 10:20

## 2022-10-04 NOTE — ED TRIAGE NOTES
Pt states that she has had body aches, vomiting, diarrhea, and abdominal discomfort since yesterday. Denies fever.

## 2022-10-04 NOTE — ED PROVIDER NOTES
HPI     29-year-old female with a history of asthma and diabetes here with diffuse body aches, vomiting, diarrhea. Her symptoms started yesterday. No fever. No sick contacts with similar. She is still able to eat and drink and keep things down. No blood in stool or vomit. She says that earlier at the onset she was having some diffuse abdominal pain, however this is resolved and she currently does not have any abdominal pain. No urinary symptoms. No abnormal bleeding or discharge. Past Medical History:   Diagnosis Date    Asthma     [facility]        History reviewed. No pertinent surgical history. History reviewed. No pertinent family history. Social History     Socioeconomic History    Marital status: SINGLE     Spouse name: Not on file    Number of children: Not on file    Years of education: Not on file    Highest education level: Not on file   Occupational History    Not on file   Tobacco Use    Smoking status: Every Day     Packs/day: 1.00     Years: 5.00     Pack years: 5.00     Types: Cigarettes    Smokeless tobacco: Current   Substance and Sexual Activity    Alcohol use: Yes     Comment: socially    Drug use: Not on file    Sexual activity: Not on file   Other Topics Concern    Not on file   Social History Narrative    Not on file     Social Determinants of Health     Financial Resource Strain: Not on file   Food Insecurity: Not on file   Transportation Needs: Not on file   Physical Activity: Not on file   Stress: Not on file   Social Connections: Not on file   Intimate Partner Violence: Not on file   Housing Stability: Not on file         ALLERGIES: Patient has no known allergies. Review of Systems  Review of Systems   Constitutional: (-) weight loss. HEENT: (-) stiff neck   Eyes: (-) discharge. Respiratory: (-) cough. Cardiovascular: (-) syncope. Gastrointestinal: (-) blood in stool. Genitourinary: (-) hematuria. Musculoskeletal: (-) myalgias.    Neurological: (-) seizure. Skin: (-) petechiae  Lymph/Immunologic: (-) enlarged lymph nodes  All other systems reviewed and are negative. Vitals:    10/04/22 1005   BP: (!) 125/91   Pulse: (!) 114   Resp: 20   Temp: 98.6 °F (37 °C)   SpO2: 99%   Weight: 74 kg (163 lb 2.3 oz)            Physical Exam Nursing note and vitals reviewed. Constitutional: oriented to person, place, and time. appears well-developed and well-nourished. No distress. Head: Normocephalic and atraumatic. Sclera anicteric  Nose: No rhinorrhea  Mouth/Throat: Oropharynx is clear and moist. Pharynx normal  Eyes: Conjunctivae are normal. Pupils are equal, round, and reactive to light. Right eye exhibits no discharge. Left eye exhibits no discharge. Neck: Painless normal range of motion. Neck supple. No LAD. Cardiovascular: Normal rate, regular rhythm, normal heart sounds and intact distal pulses. Exam reveals no gallop and no friction rub. No murmur heard. Pulmonary/Chest:  No respiratory distress. No wheezes. No rales. No rhonchi. No increased work of breathing. No accessory muscle use. Good air exchange throughout. Abdominal: soft, non-tender, no rebound or guarding. No hepatosplenomegaly. Normal bowel sounds throughout. Back: no tenderness to palpation, no deformities, no CVA tenderness  Extremities/Musculoskeletal: Normal range of motion. no tenderness. No edema. Distal extremities are neurovasc intact. Lymphadenopathy:   No adenopathy. Neurological:  Alert and oriented to person, place, and time. Coordination normal. CN 2-12 intact. Motor and sensory function intact. Skin: Skin is warm and dry. No rash noted. No pallor. MDM  34y F here with body aches, vomiting, and diarrhea. Will need labs, fluids, zofran, and toradol.  No abdominal tenderness on exam.       Procedures